# Patient Record
(demographics unavailable — no encounter records)

---

## 2019-01-01 NOTE — PCM.NBDC
[place] Discharge Summary





- Hospital Course


HPI/: 





 born  via C/S at 1049 on 2018. Mother tachycardic to 120-130HR

, febrile to 100.8F prior to .  emerged limp, shallow 

breathing. SaO2 40% at 5 minutes of life at which point CPAP applied via mask w

/ PEEP of 5-7.  had deep retractions and dropping saturations to <80% 

without CPAP. BCx, CBC, CRP drawn. APGARS 4/6/7. Ampicillin/Gentamycin given. 

Patient started on maintanence fluids





Birth Weight 4.312 kg


Cord Blood 7.088 BE -9


initial d-stick 164 mg/dl


CXR consistent w/ TTN





Maternal hx remarkable for bipolar d/o taking quetiapine and sertraline during 

pregnancy. ROM around 2am or appr 8 hours prior to delivery at which time 

mother was given vancomycin. 





Maternal Hx:


mom 20y


O+





cord pH 7.088 BE -9





- Discharge Data


Date of Birth: 19


Delivery Time: 10:49


Discharge Disposition: DC/Tfer to Acute Hospital 02


Condition: Good





- Discharge Diagnosis/Problem(s)


(1)  sepsis


SNOMED Code(s): 115349841


   ICD Code: P36.9 - BACTERIAL SEPSIS OF , UNSPECIFIED   Status: Acute   

Current Visit: Yes   





- Discharge Plan





- Discharge Summary/Plan Comment


DC Time >30 min.: Yes





[place] History





- [place] Admission Detail


Date of Service: 19


[place] Admission Detail: 








40+2wk F  born via C/S w/ hx of maternal fever prior to delivery 100.8F, 

ROM appr 8hrs, GBS negative.  has poor respiratory effort, unable to 

maintain saturation, and requires ongoing respiratory support. He is currently 

receiving CPAP PEEP 6 FiO2 40% . 's presentation consistent w/ TTN (CXR 

showing coarse markings w/ no focality) requiring PEEP to maintain normal 

saturations and decrease work of breathing. Given maternal fever prior to 

delivery and clinical presentation, abx treatment for  sepsis is 

started. 








PLAN


-Resp:


 CPAP w/ PEEP of 6 to maintain Saturation of >90





ID


- ampicilin/gentamycin


- CBC, BCx, CRP





FENGI


- D5W at 50mL/kg/24hr


- OGT placement for decompression


Infant Delivery Method: Emergent 





- Maternal History


Maternal  Number: [id number]


: 2


Term: 0


Abortions: 1


Live Births: 0


Mother's Blood Type: O


Mother's Rh: Positive


Prenatal Care Received: Yes


Labs Drawn if Required: Yes


Pregnancy Complications: Other (See Below)


Other Pregnancy Complications: GBS negative, maternal hx of bipolar d/o taking 

sertraline, quetiapine





- Delivery Data


APGAR Total Score 1 Minute: 4


APGAR Total Score 5 Minutes: 6


APGAR Total Score 10 Minutes: 7


Resuscitation Effort: Deep Suction, Dried and Stimulated, Place in Radiant 

Warmer, T-Piece Respirations


 Support Required: After Delivery of Infant, [place] [facility], NICU (

transferring to NICU)


Infant Delivery Method: Primary 





 Nursery Info & Exam





- Exam


Exam: See Below





- Vital Signs


 Birth Weight: 4.312 kg


Current Weight: 4.312 kg





- Nursery Information


Sex, Infant: Female


Cry Description: Weak


[place] Reflex: Weak


Bed Type: Radiant Warmer


Birth Complications: Other (See Below) (suspected maternal chorioamnionitis, 

maternal fever, resuscitation and resp. support needed)





- Physical Exam


Head: Face Symmetrical, Atraumatic, Normocephalic


Respiratory: Other (decreased breath sounds b/l, decreased air entry,)





 POC Testing





- Bilirubin Screening


Delivery Date: 19


Delivery Time: 10:49

## 2019-01-01 NOTE — CR
EXAMINATION: Portable chest radiograph.

 

HISTORY: Tachycardia.

 

FINDINGS: 

The trachea is midline. The cardiothymic silhouette is within normal limits.

Coarsened centralized pulmonary opacities bilaterally. No pleural effusion or

pneumothorax. No air trapping. Lung volumes appear normal.

 

Osseous structures appear unremarkable.

 

IMPRESSION: 

Centralized hazy pulmonary opacities, most likely representing TTN. Early

pneumonia cannot be excluded.

## 2019-01-01 NOTE — PCM.NBADM
History





- [place] Admission Detail


Date of Service: 19


Infant Delivery Method: Emergent 





- Maternal History


Maternal MR Number: [id number]


: 2


Term: 0


Abortions: 1


Live Births: 0


Mother's Blood Type: O


Mother's Rh: Positive


Prenatal Care Received: Yes


Labs Drawn if Required: Yes


Pregnancy Complications: Other (See Below)


Other Pregnancy Complications: GBS negative, maternal hx of bipolar d/o taking 

sertraline, quetiapine





- Delivery Data


Delivery Data: 





 born  via C/S at 1049 on 2018. Mother tachycardic to 120-130HR

, febrile to 100.8F prior to .  emerged limp, shallow 

breathing. SaO2 40% at 5 minutes of life at which point CPAP applied via mask w

/ PEEP of 5-7.  had deep retractions and dropping saturations to <80% 

without CPAP. BCx, CBC, CRP drawn. APGARS 4/6/7. Ampicillin/Gentamycin given. 

Patient started on maintanence fluids





Birth Weight 4.312 kg


Cord Blood 7.088 BE -9


initial d-stick 164 mg/dl


CXR consistent w/ TTN





Maternal hx remarkable for bipolar d/o taking quetiapine and sertraline during 

pregnancy. ROM around 2am or appr 8 hours prior to delivery at which time 

mother was given vancomycin. 





Maternal Hx:


mom 20y


O+





cord pH 7.088 BE -9





APGAR Total Score 1 Minute: 4


APGAR Total Score 5 Minutes: 6


APGAR Total Score 10 Minutes: 7


Resuscitation Effort: Deep Suction, Dried and Stimulated, Place in Radiant 

Warmer, T-Piece Respirations


 Support Required: After Delivery of Infant,  Nursery, NICU (

transferring to NICU)


Infant Delivery Method: Primary 





[facility] Nursery Information


Sex, Infant: Female


Weight: 4.312 kg


Cry Description: Weak


[place] Reflex: Weak


Heart Rate Apical: 130


Bed Type: Radiant Warmer


Birth Complications: Other (See Below) (suspected maternal chorioamnionitis, 

maternal fever, resuscitation and resp. support needed)





 Physician Exam





- Exam


Exam: See Below


Activity: Active, Lethargic


Resting Posture: Extension


Head: Face Symmetrical, Atraumatic, Normocephalic


Eyes: Bilateral: Normal Inspection


Ears: Normal Appearance, Symmetrical


Nose: Normal Inspection, Normal Mucosa


Mouth: Nnormal Inspection, Palate Intact


Neck: Normal Inspection


Chest/Cardiovascular: Normal Appearance


Respiratory: Breath Sounds Diminished


Abdomen/GI: Normal Bowel Sounds


Genitalia (Female): Normal External Exam


Spine/Skeletal: Normal Inspection


Extremities: Normal Inspection


Skin: Intact





 Assessment and Plan


(1)  sepsis


SNOMED Code(s): 515290394


   Code(s): P36.9 - BACTERIAL SEPSIS OF , UNSPECIFIED   Status: Acute   

Current Visit: Yes   


Assessment:: 


40+2wk F  born via C/S w/ hx of maternal fever prior to delivery 100.8F, 

ROM appr 8hrs, GBS negative.  has poor respiratory effort, unable to 

maintain saturation, and requires ongoing respiratory support. He is currently 

receiving CPAP PEEP 6 FiO2 40% . 's presentation consistent w/ TTN (CXR 

showing coarse markings w/ no focality) requiring PEEP to maintain normal 

saturations and decrease work of breathing. Given maternal fever prior to 

delivery and clinical presentation, abx treatment for  sepsis is 

started. 








PLAN


-Resp:


 CPAP w/ PEEP of 6 to maintain Saturation of >90





ID


- ampicilin/gentamycin


- CBC, BCx, CRP





FENGI


- D5W at 50mL/kg/24hr


- OGT placement for decompression











Problem List Initiated/Reviewed/Updated: Yes


Orders (Last 24 Hours): 


 Active Orders 24 hr











 Category Date Time Status


 


 Patient Status [ADT] Routine ADT  19 11:26 Active


 


 Blood Glucose Check, Bedside [RC] ONETIME Care  19 11:26 Active


 


  Hearing Screen [RC] ROUTINE Care  19 11:26 Active


 


 [place] Intake and Output [RC] QSHIFT Care  19 11:26 Active


 


 Notify Provider [RC] PRN Care  19 11:26 Active


 


 Oxygen Therapy [RC] ASDIRECTED Care  19 11:26 Active


 


 Vaccines to be Administered [RC] PER UNIT ROUTINE Care  19 11:26 Active


 


 Verify Patient Consent Obtain [RC] ASDIRECTED Care  19 11:26 Active


 


 Vital Measures,  [RC] Per Unit Routine Care  19 11:26 Active


 


 BILIRUBIN,  PROFILE [CHEM] Routine Lab  19 11:26 Ordered


 


 C-REACTIVE PROTEIN [CHEM] Routine Lab  19 12:20 Received


 


 CBC WITH MANUAL DIFF [HEME] Routine Lab  19 11:23 Ordered


 


 CORD BLOOD TYPE [BBK] Routine Lab  19 10:50 Received


 


 CULTURE BLOOD [BC] Stat Lab  19 11:25 Ordered


 


 CULTURE BLOOD [BC] Stat Lab  19 12:20 Received


 


  SCREENING (STATE) [POC] Routine Lab  19 11:26 Ordered


 


 Ampicillin 430 mg Med  19 12:30 Active





 Water For Injection, Sterile [Sterile Water for   





 Injection] 14.5 ml   





 IV Q12H   


 


 Bacitracin/Neomycin/Polymyxin [Triple Antibiotic Oint] Med  19 11:26 

Active





 See Dose Instructions  TOP ASDIRECTED PRN   


 


 Dextrose 10% in Water 500 ml Med  19 12:00 Active





 IV ASDIRECTED   


 


 Erythromycin Base [Erythromycin 0.5% Ophth Oint] Med  19 11:26 Active





 1 gm EYEBOTH ONETIME PRN   


 


 Gentamicin 16 mg Med  19 13:30 Active





 Dextrose 5% in Water 14.4 ml   





 IV Q24H   


 


 Lidocaine 1% [Xylocaine-MPF 1%] Med  19 11:26 Active





 See Dose Instructions  INJECT ONETIME PRN   


 


 Pharmacy to Dose - Ampicillin Med  19 12:00 Active





 1 dose .XX ASDIRECTED   


 


 Pharmacy to Dose - Gentamicin Med  19 12:00 Active





 1 dose .XX ASDIRECTED   


 


 Phytonadione [AquaMephyton] Med  19 11:26 Active





 1 mg IM ONETIME PRN   


 


 Sucrose [Sweet-Ease Natural] Med  19 11:26 Active





 2 ml PO ASDIRECTED PRN   


 


 Blood Culture x2 Reflex Set [OM.PC] Stat Oth  19 11:23 Ordered


 


 Resuscitation Status Routine Resus Stat  19 11:26 Ordered








 Medication Orders





Ampicillin Sodium (Pharmacy To Dose - Ampicillin)  1 dose .XX ASDIRECTED DANIELLE


Erythromycin (Erythromycin 0.5% Ophth Oint)  1 gm EYEBOTH ONETIME PRN


   PRN Reason: For Delivery


Gentamicin Sulfate (Pharmacy To Dose - Gentamicin)  1 dose .XX ASDIRECTED DANIELLE


Dextrose/Water (Dextrose 10% In Water)  500 mls @ 6 mls/hr IV ASDIRECTED DANIELLE


Ampicillin Sodium 430 mg/ (Sterile Water)  14.5 mls @ 29 mls/hr IV Q12H DANIELLE


Gentamicin Sulfate 16 mg/ (Dextrose/Water)  16 mls @ 32 mls/hr IV Q24H DANIELLE


Lidocaine HCl (Xylocaine-Mpf 1%)  0 ml INJECT ONETIME PRN


   PRN Reason: Circumcision


Neomycin/Polymyxin/Bacitracin (Triple Antibiotic Oint)  0 gm TOP ASDIRECTED PRN


   PRN Reason: circumcision


Phytonadione (Aquamephyton)  1 mg IM ONETIME PRN


   PRN Reason: For Delivery


Sucrose (Sweet-Ease Natural)  2 ml PO ASDIRECTED PRN


   PRN Reason: Circimcision